# Patient Record
Sex: MALE
[De-identification: names, ages, dates, MRNs, and addresses within clinical notes are randomized per-mention and may not be internally consistent; named-entity substitution may affect disease eponyms.]

---

## 2023-08-29 PROBLEM — Z00.00 ENCOUNTER FOR PREVENTIVE HEALTH EXAMINATION: Status: ACTIVE | Noted: 2023-08-29

## 2023-09-02 ENCOUNTER — NON-APPOINTMENT (OUTPATIENT)
Age: 73
End: 2023-09-02

## 2023-09-05 ENCOUNTER — APPOINTMENT (OUTPATIENT)
Dept: SURGICAL ONCOLOGY | Facility: CLINIC | Age: 73
End: 2023-09-05
Payer: MEDICARE

## 2023-09-05 ENCOUNTER — NON-APPOINTMENT (OUTPATIENT)
Age: 73
End: 2023-09-05

## 2023-09-05 VITALS
OXYGEN SATURATION: 96 % | HEIGHT: 67 IN | DIASTOLIC BLOOD PRESSURE: 82 MMHG | BODY MASS INDEX: 36.57 KG/M2 | WEIGHT: 233 LBS | SYSTOLIC BLOOD PRESSURE: 135 MMHG | RESPIRATION RATE: 17 BRPM | HEART RATE: 79 BPM

## 2023-09-05 PROCEDURE — 99205 OFFICE O/P NEW HI 60 MIN: CPT

## 2023-09-07 ENCOUNTER — APPOINTMENT (OUTPATIENT)
Dept: SURGICAL ONCOLOGY | Facility: CLINIC | Age: 73
End: 2023-09-07
Payer: MEDICARE

## 2023-09-07 PROCEDURE — 25078 RESECT FORARM/WRIST TUM 3CM>: CPT | Mod: LT

## 2023-09-07 PROCEDURE — 14020 TIS TRNFR S/A/L 10 SQ CM/<: CPT

## 2023-09-07 NOTE — PROCEDURE
[Excision Of Lesion] : excision of lesion [Melanoma In Situ Suspected] : melanoma in situ suspected [Patient] : patient [Risks] : risks [___ ml Inj] : [unfilled] ~Uml [1%] : 1% [With Epi] : with epinephrine [Betadine] : using Betadine [Excisional: Margin ___mm] : the lesion was excised with a  [unfilled] ~Umm margin in an elliptical fashion [Simple Sutures] : simple sutures were used for the skin closure [Chromic] : chromic suture(s) [___ # of Sutures] : [unfilled] [Size: ___-0] : [unfilled]-0 [Running] : running [Vicryl] : Vicryl suture(s) [Dressing Applied] : a sterile dressing was placed [Sent to Pathology] : the excised lesion was place in buffered formalin and sent for pathology [Tolerated Well] : the patient tolerated the procedure well [No Complications] : there were no complications [___ Week(s)] : in [unfilled] week(s) [FreeTextEntry5] : left forearm

## 2023-09-07 NOTE — ASSESSMENT
[FreeTextEntry1] : melanoma in-situ excised from the left forearm with 5 mm margins 4 cm x 2 cm  and closed with adjacent tissue transfer  Plan: check pathology RTO 2 weeks

## 2023-09-18 ENCOUNTER — OUTPATIENT (OUTPATIENT)
Dept: OUTPATIENT SERVICES | Facility: HOSPITAL | Age: 73
LOS: 1 days | End: 2023-09-18
Payer: MEDICARE

## 2023-09-18 ENCOUNTER — RESULT REVIEW (OUTPATIENT)
Age: 73
End: 2023-09-18

## 2023-09-18 DIAGNOSIS — C80.1 MALIGNANT (PRIMARY) NEOPLASM, UNSPECIFIED: ICD-10-CM

## 2023-09-18 PROCEDURE — 88321 CONSLTJ&REPRT SLD PREP ELSWR: CPT

## 2023-09-20 LAB — SURGICAL PATHOLOGY STUDY: SIGNIFICANT CHANGE UP

## 2023-09-21 ENCOUNTER — APPOINTMENT (OUTPATIENT)
Dept: SURGICAL ONCOLOGY | Facility: CLINIC | Age: 73
End: 2023-09-21
Payer: MEDICARE

## 2023-09-21 VITALS
WEIGHT: 230 LBS | SYSTOLIC BLOOD PRESSURE: 140 MMHG | RESPIRATION RATE: 16 BRPM | OXYGEN SATURATION: 97 % | HEIGHT: 67 IN | HEART RATE: 67 BPM | BODY MASS INDEX: 36.1 KG/M2 | DIASTOLIC BLOOD PRESSURE: 81 MMHG

## 2023-09-21 DIAGNOSIS — D03.9 MELANOMA IN SITU, UNSPECIFIED: ICD-10-CM

## 2023-09-21 PROCEDURE — 99024 POSTOP FOLLOW-UP VISIT: CPT

## 2023-09-22 LAB — CORE LAB BIOPSY: NORMAL

## 2024-03-04 NOTE — ASSESSMENT
[FreeTextEntry1] : IMP:  72yo M w/ left forearm melanoma in situ s/p WLE 9/2023 - no residual melanoma     PLAN:  RTO Q6 months continue close f/u w/ derm

## 2024-03-04 NOTE — HISTORY OF PRESENT ILLNESS
[de-identified] : Mr. HAIM OVALLE is a 73 year old man here for a follow-up visit  Referred by Dr. Emelia Short   He has had BCC in the past but no melanomas  The lesion was noticed by his internist who referred him to Dr. Short for the biopsy.  PMH: HTN, HLD, BCC  Family history: mother cervical and lung cancer   Left forearm 8/22/23: Melanoma in situ, superficial spreading type; extending to the base and lateral tissue edges.   s/p WLE of left forearm on 9/7/2023: no residual melanoma

## 2024-03-04 NOTE — CONSULT LETTER
[Dear  ___] : Dear  [unfilled], [Courtesy Letter:] : I had the pleasure of seeing your patient, [unfilled], in my office today. [Please see my note below.] : Please see my note below. [Consult Closing:] : Thank you very much for allowing me to participate in the care of this patient.  If you have any questions, please do not hesitate to contact me. [Sincerely,] : Sincerely, [FreeTextEntry1] : He will follow-up with both of us. [FreeTextEntry3] : Joey Irving MD FACS Chief of Surgical Oncology

## 2024-03-04 NOTE — PHYSICAL EXAM
[Normal] : supple, no neck mass and thyroid not enlarged [Normal Supraclavicular Lymph Nodes] : normal supraclavicular lymph nodes [Normal Axillary Lymph Nodes] : normal axillary lymph nodes [Normal] : oriented to person, place and time, with appropriate affect [de-identified] : Normal epitrochlear nodes [de-identified] : left forearm excision site healing nicely

## 2024-03-11 ENCOUNTER — APPOINTMENT (OUTPATIENT)
Dept: SURGICAL ONCOLOGY | Facility: CLINIC | Age: 74
End: 2024-03-11